# Patient Record
Sex: FEMALE
[De-identification: names, ages, dates, MRNs, and addresses within clinical notes are randomized per-mention and may not be internally consistent; named-entity substitution may affect disease eponyms.]

---

## 2018-02-18 NOTE — EDM.PDOC
ED HPI GENERAL MEDICAL PROBLEM





- General


Chief Complaint: General


Stated Complaint: Hypertension/chest discomfort


Time Seen by Provider: 02/18/18 13:05


Source of Information: Reports: Patient


History Limitations: Reports: No Limitations





- History of Present Illness


INITIAL COMMENTS - FREE TEXT/NARRATIVE: 


Pt basically claims she has no medical problems and not on any 

medication.According to patient she went to see her Chiropractor on 2/14/18 

when she was told by her Chiropractor that her bottom number of her blood 

pressure was high. So she bought blood pressure monitor and has been checking 

her BP twice daily. When she checked her blood pressure today it was 158/

103mmhg. And she started having chest discomfort around 11 am today, she claims 

she feels a pressure, no chest pain, no nausea or vomiting, no diaphoresis. The 

pressure feeling does not get worse with exertion or eating. Pt got concerned 

and hence came to emergency room. she is presently not in any discomfort, no 

epigastric pain, belching or burping.





Onset: Today


Onset Date: 02/18/18


Onset Time: 11:00


Location: Reports: Chest


Quality: Reports: Pressure


Severity: Mild


Improves with: Reports: None


Worsens with: Reports: None


Associated Symptoms: Denies: Confusion, Chest Pain, Cough, Diaphoresis, Fever/

Chills, Headaches, Nausea/Vomiting, Rash, Seizure, Shortness of Breath, Syncope

, Weakness





- Related Data


 Allergies











Allergy/AdvReac Type Severity Reaction Status Date / Time


 


No Known Allergies Allergy   Verified 09/12/16 15:14











Home Meds: 


 Home Meds





NK [No Known Home Meds]  09/12/16 [History]











Past Medical History


Gastrointestinal History: Reports: GERD


OB/GYN History: Reports: Pregnancy





- Infectious Disease History


Infectious Disease History: Reports: Chicken Pox, Measles, Mumps





- Past Surgical History


HEENT Surgical History: Reports: None, Tonsillectomy


GI Surgical History: Reports: None





Social & Family History





- Tobacco Use


Smoking Status *Q: Never Smoker





- Recreational Drug Use


Recreational Drug Use: No





ED ROS GENERAL





- Review of Systems


Review Of Systems: See Below


Constitutional: Denies: Fever, Chills, Weakness, Fatigue, Night Sweats, 

Diaphoresis


HEENT: Denies: Rhinitis, Throat Pain, Throat Swelling, Vision Change


Respiratory: Denies: Shortness of Breath, Wheezing, Pleuritic Chest Pain, Cough

, Sputum


Cardiovascular: Reports: Blood Pressure Problem.  Denies: Chest Pain, Edema, 

Lightheadedness


GI/Abdominal: Denies: Abdominal Pain, Nausea, Vomiting


: Denies: Urgency, Urinary Retention


Musculoskeletal: Denies: Joint Pain, Joint Swelling


Skin: Denies: Bruising, Pruritis, Rash


Neurological: Denies: Confusion, Dizziness, Headache, Numbness, Tingling, 

Weakness





ED EXAM, GENERAL





- Physical Exam


Exam: See Below


Exam Limited By: No Limitations


General Appearance: Alert, WD/WN, No Apparent Distress, Other (she answers 

appropriately and not in any discomfrot)


Eye Exam: Bilateral Eye: EOMI, PERRL


Ears: Normal External Exam, Normal Canal, Hearing Grossly Normal, Normal TMs


Ear Exam: Bilateral Ear: Auricle Normal, Canal Normal, TM normal


Nose: Normal Inspection, Normal Mucosa, No Blood


Throat/Mouth: Normal Inspection, Normal Lips, Normal Teeth, Normal Gums, Normal 

Oropharynx, Normal Voice, No Airway Compromise


Head: Atraumatic, Normocephalic


Neck: Normal Inspection, Supple, Non-Tender, Full Range of Motion


Respiratory/Chest: No Respiratory Distress, Lungs Clear, Normal Breath Sounds, 

No Accessory Muscle Use, Chest Non-Tender


Cardiovascular: Normal Peripheral Pulses, Regular Rate, Rhythm, No Edema, No 

Gallop, No JVD, No Murmur, No Rub


Peripheral Pulses: 2+: Carotid (L), Carotid (R), Brachial (L), Brachial (R)


GI/Abdominal: Normal Bowel Sounds, Soft, Non-Tender, No Organomegaly, No 

Distention, No Abnormal Bruit, No Mass


Extremities: Normal Inspection, Normal Range of Motion, Non-Tender, Normal 

Capillary Refill, No Pedal Edema


Neurological: Alert, Oriented, CN II-XII Intact, Normal Cognition, Normal Gait, 

Normal Reflexes, No Motor/Sensory Deficits


Psychiatric: Normal Affect, Normal Mood


Skin Exam: Warm, Intact





EKG INTERPRETATION


EKG Date: 02/18/18


Rhythm: NSR


Rate (Beats/Min): 70


Axis: Normal


P-Wave: Present


QRS: Normal


ST-T: Normal


QT: Normal





Course





- Vital Signs


Text/Narrative:: 


Pt's inital BP was 164/95mmhg. Her EKG was in normal sinus rhythm. Pt was given 

lisinopril 20mg in the emergency room. Also CBC , CMP and troponin ordered. Pt 

has remained stable in the emergency room. CBC, CMP are normal. Her troponin is 

negative. she does not have any more chest discomfort. Pt's repeat BP  30 

minutes later is140/83mmhg. 





Pt reassured that she does have hypertension and needs to stay on lisinopril 

for now. Advised to monitor her Bp once daily, should gradually improve. 

Advised to keep pressure under 140/90mmhg. regular exercise and low salt diet 

advised. 





Followup in clinic in 1 wk for recheck.








- Orders/Labs/Meds


Orders: 


 Active Orders 24 hr











 Category Date Time Status


 


 EKG Documentation Completion [RC] ASDIRECTED Care  02/18/18 13:09 Active


 


 Lisinopril [Prinivil] Med  02/18/18 13:15 Active





 20 mg PO DAILY   








 Medication Orders





Lisinopril (Prinivil)  20 mg PO DAILY MARLEE








Labs: 


 Laboratory Tests











  02/18/18 02/18/18 Range/Units





  13:15 13:15 


 


WBC  5.4   (4.0-11.0)  K/uL


 


RBC  4.54   (3.80-5.80)  M/uL


 


Hgb  13.9   (11.5-16.5)  g/dL


 


Hct  41.4   (37.0-47.0)  %


 


MCV  91   (76-96)  fL


 


MCH  30.6   (27.0-32.0)  pg


 


MCHC  33.6   (31.0-35.0)  g/dL


 


RDW  13.4   (11.0-16.0)  %


 


Plt Count  183   (150-500)  K/uL


 


MPV  9.5   (6.0-10.0)  fL


 


Neut % (Auto)  57.0   (45.0-70.0)  %


 


Lymph % (Auto)  29.2   (20.0-40.0)  %


 


Mono % (Auto)  11.2 H   (3.0-10.0)  %


 


Eos % (Auto)  2.4   (1.0-5.0)  %


 


Baso % (Auto)  0.2   (0.0-0.5)  %


 


Neut # (Auto)  3.07   (2.00-7.50)  K/uL


 


Lymph # (Auto)  1.57   (1.50-4.00)  K/uL


 


Mono # (Auto)  0.60   (0.20-0.80)  K/uL


 


Eos # (Auto)  0.13   (0.04-0.40)  K/uL


 


Baso # (Auto)  0.01 L   (0.02-0.10)  K/uL


 


Sodium   146 H  (136-145)  mmol/L


 


Potassium   4.2  (3.5-5.1)  mmol/L


 


Chloride   107  ()  mmol/L


 


Carbon Dioxide   28.4  (21.0-32.0)  mmol/L


 


Anion Gap   14.8  (5.0-15.0)  mmol/L


 


BUN   18  (8-26)  mg/dL


 


Creatinine   0.90  (0.55-1.02)  mg/dL


 


Est Cr Clr Drug Dosing   TNP  


 


Estimated GFR (MDRD)   > 60  (>60)  MLS/MIN


 


BUN/Creatinine Ratio   20.0  (6-25)  


 


Glucose   98  ()  mg/dL


 


Calcium   9.2  (8.5-10.1)  mg/dL


 


Total Bilirubin   0.3  (0.0-1.0)  mg/dL


 


AST   22  (15-37)  U/L


 


ALT   35  (12-78)  U/L


 


Alkaline Phosphatase   84  ()  U/L


 


Troponin I   < 0.017  (0.000-0.060)  ng/mL


 


Total Protein   7.1  (6.4-8.2)  g/dL


 


Albumin   3.6  (3.4-5.0)  g/dL


 


Globulin   3.5  (2.2-4.2)  g/dL


 


Albumin/Globulin Ratio   1.0  (0.8-2.0)  











Meds: 


Medications











Generic Name Dose Route Start Last Admin





  Trade Name Freq  PRN Reason Stop Dose Admin


 


Lisinopril  20 mg  02/18/18 13:15  





  Prinivil  PO   





  DAILY [facility]   














Departure





- Departure


Time of Disposition: 14:00


Disposition: Home, Self-Care 01


Condition: Good


Clinical Impression: 


 Hypertension, Chest discomfort








- Discharge Information


Instructions:  Lisinopril tablets


Referrals: 


PCP,None [Primary Care Provider] - 


Forms:  ED Department Discharge





- Problem List & Annotations


(1) Chest discomfort


SNOMED Code(s): 657883447


   Code(s): R07.89 - OTHER CHEST PAIN   Status: Acute   Current Visit: Yes   





(2) Hypertension


SNOMED Code(s): 90006858


   Code(s): I10 - ESSENTIAL (PRIMARY) HYPERTENSION   Status: Acute   Current 

Visit: Yes   





- Problem List Review


Problem List Initiated/Reviewed/Updated: Yes





- My Orders


Last 24 Hours: 


My Active Orders





02/18/18 13:09


EKG Documentation Completion [RC] ASDIRECTED 





02/18/18 13:15


Lisinopril [Prinivil]   20 mg PO DAILY 














- Assessment/Plan


Last 24 Hours: 


My Active Orders





02/18/18 13:09


EKG Documentation Completion [RC] ASDIRECTED 





02/18/18 13:15


Lisinopril [Prinivil]   20 mg PO DAILY 











Assessment:: 


New onset HTN with chest discomfort





Plan: 


Pt's inital BP was 164/95mmhg. Her EKG was in normal sinus rhythm. Pt was given 

lisinopril 20mg in the emergency room. Also CBC , CMP and troponin ordered. Pt 

has remained stable in the emergency room. CBC, CMP are normal. Her troponin is 

negative. she does not have any more chest discomfort. Pt's repeat BP  30 

minutes later is140/83mmhg. 





Pt reassured that she does have hypertension and needs to stay on lisinopril 

for now. Advised to monitor her Bp once daily, should gradually improve. 

Advised to keep pressure under 140/90mmhg. regular exercise and low salt diet 

advised. 





Followup in clinic in 1 wk for recheck.

## 2018-03-30 NOTE — OR
DATE OF OPERATION:  03/30/2018

 

PREOPERATIVE DIAGNOSIS:  Screening colonoscopy.

 

POSTOPERATIVE DIAGNOSES:

1. Moderate-sized pedunculated rectal polyp.

2. Moderate diverticulosis of the sigmoid colon.

 

OPERATION:  Screening colonoscopy with snare polypectomy.

 

COMPLICATIONS:  None.

 

DRAINS:  None.

 

SPECIMENS:  Rectal polyp.

 

ESTIMATED BLOOD LOSS:  Minimal.

 

ANESTHESIA:  General propofol anesthesia.

 

INDICATION:  Ms. Harry is a 67-year-old female, here for screening colonoscopy.  She has

no prior family history or history of polyps.  The above-mentioned procedure was explained.

The risks, benefits, complications were explained.  The patient understood and agreed, and

was brought to the operating room.

 

DESCRIPTION OF PROCEDURE:  The patient was brought to the operating room, placed in the left

lateral decubitus position on the operating room table.  Satisfactory general propofol

anesthesia was administered.  We began by performing a rectal examination, which was within

normal limits.  I then placed the endoscope by finger introduction into the rectum and

subsequently advanced this to the level of the cecum.  The cecum was identified by the

appendiceal orifice and the ileocecal valve and cecal strap.  Careful evaluation of the

mucosa was performed on withdrawal.  This revealed some moderate diverticulosis of the

sigmoid colon with no evidence of active inflammation and there was a medium-sized

approximately 0.5 cm pedunculated polyp within the rectum.  This was subsequently snared and

removed.  Hemostasis appeared satisfactory.  Retroflexion was then performed of the rectum,

which revealed no other findings.  The colon was then subsequently decompressed and the

endoscope was withdrawn.  The patient tolerated the procedure well.  There were no

complications.  Instrument count

was correct.  The patient was awoken in the OR and taken to PACU for recovery.  Recommend

followup colonoscopy in 5 years.

 

 

EBONY

DD:  03/30/2018 12:27:06

DT:  03/30/2018 13:18:12

Job #:  [id number]

## 2018-08-17 NOTE — EDM.PDOC
ED HPI GENERAL MEDICAL PROBLEM





- General


Chief Complaint: Abdominal Pain


Stated Complaint: abdominal pain


Time Seen by Provider: 08/17/18 18:30


Source of Information: Reports: Patient


History Limitations: Reports: No Limitations





- History of Present Illness


INITIAL COMMENTS - FREE TEXT/NARRATIVE: 


According to patient she claims that she has not had a bowel movement for the 

past 2 wks now. She has been feeding well until today. Pt has no appetite and 

has been nauseous. No abdominal bloating. No fever or chills. 





Pt does have Chronic Constipation all her life. She recently had GI evaluation 

in [facility], and was started on regime for bowel movement, which sh3 has 

not started yet.





Duration: Week(s): (2), Constant


Improves with: Reports: None


Worsens with: Reports: None


Associated Symptoms: Reports: Nausea/Vomiting.  Denies: Confusion, Chest Pain, 

Cough, Diaphoresis, Fever/Chills, Headaches, Rash, Seizure, Shortness of Breath

, Syncope, Weakness





- Related Data


 Allergies











Allergy/AdvReac Type Severity Reaction Status Date / Time


 


No Known Allergies Allergy   Verified 03/30/18 09:20











Home Meds: 


 Home Meds





Omeprazole 20 mg PO DAILY 08/17/18 [History]











Past Medical History


HEENT History: Reports: None


Cardiovascular History: Reports: Hypertension


Gastrointestinal History: Reports: GERD


OB/GYN History: Reports: Pregnancy


Musculoskeletal History: Reports: Fracture





- Infectious Disease History


Infectious Disease History: Reports: Chicken Pox, Measles, Mumps





- Past Surgical History


HEENT Surgical History: Reports: Tonsillectomy


GI Surgical History: Reports: Colonoscopy, EGD


Musculoskeletal Surgical History: Reports: Other (See Below)


Other Musculoskeletal Surgeries/Procedures:: surgery to left hand/wrist





Social & Family History





- Family History


GI: Reports: None





ED ROS GENERAL





- Review of Systems


Review Of Systems: See Below


Constitutional: Denies: Fever, Chills


HEENT: Denies: Rhinitis, Throat Pain, Throat Swelling


Respiratory: Denies: Cough, Sputum


Cardiovascular: Denies: Chest Pain, Lightheadedness


GI/Abdominal: Reports: Abdominal Pain, Constipation, Nausea.  Denies: Black 

Stool, Bloody Stool, Diarrhea, Vomiting


: Denies: Dysuria, Flank Pain


Musculoskeletal: Denies: Joint Pain, Joint Swelling


Skin: Denies: Pruritis, Rash





ED EXAM, GENERAL





- Physical Exam


Exam: See Below


Exam Limited By: No Limitations


General Appearance: Alert, WD/WN, No Apparent Distress


Eye Exam: Bilateral Eye: EOMI, PERRL


Ears: Normal External Exam, Normal Canal, Hearing Grossly Normal, Normal TMs


Ear Exam: Bilateral Ear: Auricle Normal, Canal Normal, TM normal


Nose: Normal Inspection, Normal Mucosa, No Blood


Throat/Mouth: Normal Inspection, Normal Lips, Normal Teeth, Normal Gums, Normal 

Oropharynx, Normal Voice, No Airway Compromise


Head: Atraumatic, Normocephalic


Neck: Normal Inspection, Supple, Non-Tender, Full Range of Motion


Respiratory/Chest: No Respiratory Distress, Lungs Clear, Normal Breath Sounds, 

No Accessory Muscle Use, Chest Non-Tender


Cardiovascular: Normal Peripheral Pulses, Regular Rate, Rhythm, No Edema, No 

Gallop, No JVD, No Murmur, No Rub


GI/Abdominal: Normal Bowel Sounds, Soft, Non-Tender, No Organomegaly, No 

Distention, No Abnormal Bruit, Other (There are large irregular firm mass in 

the left lower quadrant and upper quadrant, the swelling is nontender.)


Back Exam: Normal Inspection, Full Range of Motion, NT


Extremities: Normal Inspection, Normal Range of Motion, Non-Tender, Normal 

Capillary Refill, No Pedal Edema


Neurological: Alert, Oriented





Course





- Vital Signs


Text/Narrative:: 


Pt's CBC and CMP are normal. Her AXR upright one view should large amount of 

stool in the colon. Pt did receive Soap suds enema, and she did have good 

results. pt did feel better.





Pt has had severe constipation all her life. I have placed her on  colon regime

, Dulcolax 20mg BID for 2 day followed by 10mg BID. Also  Started on Senokot 1 

tab at bedtime, and miralax 1 tablespoon with 6-8 ozes of water at bedtime. pt 

should have atleast one bowel movement every 4-6 days to prevent severe 

constipation.





pt advised to followup with her gastroenterologist.





Last Recorded V/S: 


 Last Vital Signs











Temp  98.7 F   08/17/18 18:15


 


Pulse  57 L  08/17/18 18:15


 


Resp  12   08/17/18 18:15


 


BP  150/88 H  08/17/18 18:15


 


Pulse Ox  99   08/17/18 18:15














- Orders/Labs/Meds


Orders: 


 Active Orders 24 hr











 Category Date Time Status


 


 Enema [RC] ASDIRECTED Care  08/17/18 18:46 Active


 


 Abdomen 1V Upright [CR] Stat Exams  08/17/18 18:22 Taken











Labs: 


 Laboratory Tests











  08/17/18 08/17/18 Range/Units





  18:50 18:50 


 


WBC  9.2  D   (4.0-11.0)  K/uL


 


RBC  4.29   (3.80-5.80)  M/uL


 


Hgb  13.1   (11.5-16.5)  g/dL


 


Hct  39.2   (37.0-47.0)  %


 


MCV  91   (76-96)  fL


 


MCH  30.5   (27.0-32.0)  pg


 


MCHC  33.4   (31.0-35.0)  g/dL


 


RDW  12.9   (11.0-16.0)  %


 


Plt Count  161   (150-500)  K/uL


 


MPV  10.3 H   (6.0-10.0)  fL


 


Neut % (Auto)  67.8   (45.0-70.0)  %


 


Lymph % (Auto)  23.5   (20.0-40.0)  %


 


Mono % (Auto)  6.8   (3.0-10.0)  %


 


Eos % (Auto)  1.7   (1.0-5.0)  %


 


Baso % (Auto)  0.2   (0.0-0.5)  %


 


Neut # (Auto)  6.22   (2.00-7.50)  K/uL


 


Lymph # (Auto)  2.16   (1.50-4.00)  K/uL


 


Mono # (Auto)  0.62   (0.20-0.80)  K/uL


 


Eos # (Auto)  0.16   (0.04-0.40)  K/uL


 


Baso # (Auto)  0.02   (0.02-0.10)  K/uL


 


Sodium   140  (136-145)  mmol/L


 


Potassium   4.1  (3.5-5.1)  mmol/L


 


Chloride   107  ()  mmol/L


 


Carbon Dioxide   29.2  (21.0-32.0)  mmol/L


 


Anion Gap   7.9  (5.0-15.0)  mmol/L


 


BUN   11  D  (8-26)  mg/dL


 


Creatinine   0.98  (0.55-1.02)  mg/dL


 


Est Cr Clr Drug Dosing   TNP  


 


Estimated GFR (MDRD)   56 L  (>60)  MLS/MIN


 


BUN/Creatinine Ratio   11.2  (6-25)  


 


Glucose   94  ()  mg/dL


 


Calcium   8.9  (8.5-10.1)  mg/dL


 


Total Bilirubin   0.4  D  (0.0-1.0)  mg/dL


 


AST   20  (15-37)  U/L


 


ALT   23  (12-78)  U/L


 


Alkaline Phosphatase   75  ()  U/L


 


Total Protein   7.2  (6.4-8.2)  g/dL


 


Albumin   3.7  (3.4-5.0)  g/dL


 


Globulin   3.5  (2.2-4.2)  g/dL


 


Albumin/Globulin Ratio   1.1  (0.8-2.0)  














Departure





- Departure


Time of Disposition: 20:00


Disposition: Home, Self-Care 01


Condition: Good


Clinical Impression: 


 Constipation








- Discharge Information


Referrals: 


PCP,None [Primary Care Provider] - 


Forms:  ED Department Discharge, ED Return to Work/School Form


Additional Instructions: 


 Dulcolax tomorrow and take as directed: 20mg by mouth twice daily for 2 

days, then 10mg twice daily.   Sennakot tomorrow and start taking 1 

tablet daily at bedtime every day.  Begin taking Miralax 1 tablespoon in 6 

ounces of water daily at bedtime.  Be sure to drink plenty of fluids (water).  

Follow up with regular provider as needed.  Call with any questions.





- Problem List & Annotations


(1) Constipation


SNOMED Code(s): 48087847


   Code(s): K59.00 - CONSTIPATION, UNSPECIFIED   Status: Acute   





- Problem List Review


Problem List Initiated/Reviewed/Updated: Yes





- My Orders


Last 24 Hours: 


My Active Orders





08/17/18 18:22


Abdomen 1V Upright [CR] Stat 





08/17/18 18:46


Enema [RC] ASDIRECTED 














- Assessment/Plan


Last 24 Hours: 


My Active Orders





08/17/18 18:22


Abdomen 1V Upright [CR] Stat 





08/17/18 18:46


Enema [RC] ASDIRECTED 











Assessment:: 


Constipation chronic





Plan: 


Pt's CBC and CMP are normal. Her AXR upright one view should large amount of 

stool in the colon. Pt did receive Soap suds enema, and she did have good 

results. pt did feel better.





Pt has had severe constipation all her life. I have placed her on  colon regime

, Dulcolax 20mg BID for 2 day followed by 10mg BID. Also  Started on Senokot 1 

tab at bedtime, and miralax 1 tablespoon with 6-8 ozes of water at bedtime. pt 

should have atleast one bowel movement every 4-6 days to prevent severe 

constipation.





pt advised to followup with her gastroenterologist.

## 2018-08-19 NOTE — CR
UPRIGHT ABDOMEN, 08/17/18



There is a large amount of stool present throughout the colon.  



No evidence of obstruction or ileus.  No free air.  



The patient is status post cholecystectomy.  



There are radiodensities noted within the pelvis.  These may be within the 
distal sigmoid colon and rectum and may represent ingested pills.  They may be 
related to prior surgery.  



I do not see any other significant findings.  



[id number]
[id number]

## 2022-06-18 ENCOUNTER — HOSPITAL ENCOUNTER (EMERGENCY)
Dept: HOSPITAL 60 - LB.ED | Age: 72
Discharge: HOME | End: 2022-06-18
Payer: MEDICARE

## 2022-06-18 DIAGNOSIS — K21.9: ICD-10-CM

## 2022-06-18 DIAGNOSIS — I10: ICD-10-CM

## 2022-06-18 DIAGNOSIS — M25.561: Primary | ICD-10-CM

## 2022-06-18 DIAGNOSIS — Z79.899: ICD-10-CM
